# Patient Record
Sex: FEMALE | Race: WHITE | NOT HISPANIC OR LATINO | URBAN - NONMETROPOLITAN AREA
[De-identification: names, ages, dates, MRNs, and addresses within clinical notes are randomized per-mention and may not be internally consistent; named-entity substitution may affect disease eponyms.]

---

## 2019-02-28 ENCOUNTER — IMPORTED ENCOUNTER (OUTPATIENT)
Dept: URBAN - NONMETROPOLITAN AREA CLINIC 1 | Facility: CLINIC | Age: 49
End: 2019-02-28

## 2019-02-28 PROBLEM — H52.4: Noted: 2019-02-28

## 2019-02-28 PROBLEM — H47.323: Noted: 2019-02-28

## 2019-02-28 PROBLEM — H52.223: Noted: 2019-02-28

## 2019-02-28 PROCEDURE — 92014 COMPRE OPH EXAM EST PT 1/>: CPT

## 2019-02-28 PROCEDURE — 92015 DETERMINE REFRACTIVE STATE: CPT

## 2019-02-28 NOTE — PATIENT DISCUSSION
Simple Astigmatism OU w/Presbyopia-  discussed findings w/patient-  new spectacle Rx issued-  monitor yearly or prn ON[facility] Drusen-  discussed findings w/patient-  no treatment indicated at this time-  continue to monitor yearly or prn; 's Notes: MR 2/28/2019DFE 2/28/2019

## 2021-08-20 ENCOUNTER — IMPORTED ENCOUNTER (OUTPATIENT)
Dept: URBAN - NONMETROPOLITAN AREA CLINIC 1 | Facility: CLINIC | Age: 51
End: 2021-08-20

## 2021-08-20 PROBLEM — H47.323: Noted: 2021-08-20

## 2021-08-20 PROBLEM — H52.4: Noted: 2021-08-20

## 2021-08-20 PROBLEM — H52.223: Noted: 2021-08-20

## 2021-08-20 PROBLEM — H52.12: Noted: 2021-08-20

## 2021-08-20 PROCEDURE — 92014 COMPRE OPH EXAM EST PT 1/>: CPT

## 2021-08-20 PROCEDURE — 92015 DETERMINE REFRACTIVE STATE: CPT

## 2021-08-20 NOTE — PATIENT DISCUSSION
Simple Astigmatism OU w/Presbyopia-  discussed findings w/patient-  new spectacle Rx issued-  monitor yearly or prn ON[facility] Drusen-  discussed findings w/patient-  no treatment indicated at this time-  continue to monitor yearly or prn; 's Notes: MR 8/20/2021DFE 8/20/2021

## 2022-03-07 ENCOUNTER — EMERGENCY VISIT (OUTPATIENT)
Dept: URBAN - NONMETROPOLITAN AREA CLINIC 4 | Facility: CLINIC | Age: 52
End: 2022-03-07

## 2022-03-07 DIAGNOSIS — H33.322: ICD-10-CM

## 2022-03-07 PROCEDURE — 99214 OFFICE O/P EST MOD 30 MIN: CPT

## 2022-03-07 ASSESSMENT — VISUAL ACUITY
OD_CC: 20/20
OS_PH: 20/30
OS_CC: 20/40

## 2022-03-07 ASSESSMENT — TONOMETRY
OS_IOP_MMHG: 18
OD_IOP_MMHG: 18

## 2022-03-07 NOTE — PATIENT DISCUSSION
(With Tear) Plan for Retinopexy. Discussed the risks, benefits, alternatives, and limitations of Retinopexy. The patient stated a full understanding and a desire to proceed with the procedure.

## 2022-04-15 ASSESSMENT — TONOMETRY
OS_IOP_MMHG: 13
OD_IOP_MMHG: 14
OS_IOP_MMHG: 14
OD_IOP_MMHG: 13

## 2022-04-15 ASSESSMENT — VISUAL ACUITY
OS_CC: 20/40
OU_CC: 20/25
OD_SC: 20/20-1
OS_PH: 20/25
OU_CC: 20/20
OS_SC: 20/25
OD_CC: 20/25

## 2023-01-12 ENCOUNTER — COMPREHENSIVE EXAM (OUTPATIENT)
Dept: URBAN - NONMETROPOLITAN AREA CLINIC 4 | Facility: CLINIC | Age: 53
End: 2023-01-12

## 2023-01-12 DIAGNOSIS — H52.4: ICD-10-CM

## 2023-01-12 PROCEDURE — 92014 COMPRE OPH EXAM EST PT 1/>: CPT

## 2023-01-12 PROCEDURE — 92015 DETERMINE REFRACTIVE STATE: CPT

## 2023-01-12 ASSESSMENT — VISUAL ACUITY
OS_PH: 20/25+0
OS_CC: 20/40-1
OD_CC: 20/20

## 2023-01-12 ASSESSMENT — TONOMETRY
OD_IOP_MMHG: 14
OS_IOP_MMHG: 15

## 2024-11-20 ENCOUNTER — COMPREHENSIVE EXAM (OUTPATIENT)
Dept: URBAN - NONMETROPOLITAN AREA CLINIC 4 | Facility: CLINIC | Age: 54
End: 2024-11-20

## 2024-11-20 DIAGNOSIS — H43.812: ICD-10-CM

## 2024-11-20 DIAGNOSIS — H25.13: ICD-10-CM

## 2024-11-20 DIAGNOSIS — H52.4: ICD-10-CM

## 2024-11-20 DIAGNOSIS — H33.322: ICD-10-CM

## 2024-11-20 PROCEDURE — 92015 DETERMINE REFRACTIVE STATE: CPT

## 2024-11-20 PROCEDURE — 92014 COMPRE OPH EXAM EST PT 1/>: CPT
